# Patient Record
(demographics unavailable — no encounter records)

---

## 2025-01-21 NOTE — CONSULT LETTER
[FreeTextEntry1] : Dear Dr. SALVADOR PERERA,ALTON ,  I had the pleasure of seeing  MARY WALTERS for follow up today.  Below is my note regarding the office visit today.  Thank you so very much for allowing me to participate in MARY's  care.  Please don't hesitate to call me should any questions or issues arise .  Sincerely,   Eber Salinas MD, FACS, PU Chief, Pediatric Urology Professor of Urology and Pediatrics [facility] [facility] [employer]  President, [employer] - [place] Section Past-President, [employer] for Pediatric U[employer]

## 2025-01-21 NOTE — PHYSICAL EXAM
[TextBox_92] : PENIS: Straight protuberant penis.  Meatus ample size in orthotopic position.   SCROTUM: Symmetric testes in dependent position without palpable mass, hernia, hydrocele or varicocele

## 2025-01-21 NOTE — HISTORY OF PRESENT ILLNESS
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  He is doing well postoperatively.  Returns today for a new complaint.  He was noted recently to have an undescended left testicle. This was NOT noted at birth.  The testis has NOT been descending with time.  No history of trauma or infection or inflammation.

## 2025-01-21 NOTE — ASSESSMENT
[FreeTextEntry1] : MARY with retractile testes based on the examination of the scrotum today when he was very relaxed. I explained that retractile testes are very common and generally do not require surgery.  In some instances, however, retractile testes can ascend. Therefore, I recommended careful observation as MARY grows to make sure that one or both testes do not ascend. At this time no treatment is necessary. I recommended yearly examination in the primary care setting and he should return if there is a question regarding the position of the testis.